# Patient Record
Sex: MALE | Race: OTHER | HISPANIC OR LATINO | URBAN - METROPOLITAN AREA
[De-identification: names, ages, dates, MRNs, and addresses within clinical notes are randomized per-mention and may not be internally consistent; named-entity substitution may affect disease eponyms.]

---

## 2020-02-23 ENCOUNTER — EMERGENCY (EMERGENCY)
Facility: HOSPITAL | Age: 25
LOS: 0 days | Discharge: HOME | End: 2020-02-23
Attending: EMERGENCY MEDICINE
Payer: COMMERCIAL

## 2020-02-23 VITALS
SYSTOLIC BLOOD PRESSURE: 173 MMHG | TEMPERATURE: 98 F | DIASTOLIC BLOOD PRESSURE: 95 MMHG | RESPIRATION RATE: 17 BRPM | HEART RATE: 109 BPM | OXYGEN SATURATION: 100 %

## 2020-02-23 VITALS — WEIGHT: 265 LBS | HEIGHT: 69 IN

## 2020-02-23 DIAGNOSIS — M54.5 LOW BACK PAIN: ICD-10-CM

## 2020-02-23 DIAGNOSIS — M40.56 LORDOSIS, UNSPECIFIED, LUMBAR REGION: ICD-10-CM

## 2020-02-23 DIAGNOSIS — M54.9 DORSALGIA, UNSPECIFIED: ICD-10-CM

## 2020-02-23 PROCEDURE — 72100 X-RAY EXAM L-S SPINE 2/3 VWS: CPT

## 2020-02-23 PROCEDURE — 72100 X-RAY EXAM L-S SPINE 2/3 VWS: CPT | Mod: 26

## 2020-02-23 PROCEDURE — 99283 EMERGENCY DEPT VISIT LOW MDM: CPT

## 2020-02-23 PROCEDURE — 96372 THER/PROPH/DIAG INJ SC/IM: CPT

## 2020-02-23 PROCEDURE — 99283 EMERGENCY DEPT VISIT LOW MDM: CPT | Mod: 25

## 2020-02-23 RX ORDER — KETOROLAC TROMETHAMINE 30 MG/ML
30 SYRINGE (ML) INJECTION ONCE
Refills: 0 | Status: DISCONTINUED | OUTPATIENT
Start: 2020-02-23 | End: 2020-02-23

## 2020-02-23 RX ORDER — CYCLOBENZAPRINE HYDROCHLORIDE 10 MG/1
10 TABLET, FILM COATED ORAL ONCE
Refills: 0 | Status: COMPLETED | OUTPATIENT
Start: 2020-02-23 | End: 2020-02-23

## 2020-02-23 RX ADMIN — CYCLOBENZAPRINE HYDROCHLORIDE 10 MILLIGRAM(S): 10 TABLET, FILM COATED ORAL at 17:49

## 2020-02-23 RX ADMIN — Medication 30 MILLIGRAM(S): at 17:49

## 2020-02-23 NOTE — ED STATDOCS - PROGRESS NOTE DETAILS
24 yr. old male PMH: presents to ED with right side low back pain after coughing. Pain worse on movement and getting out of bed. Smokes Marijuana. No fever or chills. No apparent trauma. Seen and examined by attending in intake. Plan: X-Ray and pain medication. Will F/U with results and re evaluate. Zoraida CARREON

## 2020-02-23 NOTE — ED STATDOCS - ATTENDING CONTRIBUTION TO CARE
I Marcos Saucedo MD saw and examined the patient. MLP saw and examined the patient under my supervision. I discussed the care of the patient with MLP and agree with MLP's plan, assessment and care of the patient while in the ED.

## 2020-02-23 NOTE — ED ADULT TRIAGE NOTE - CHIEF COMPLAINT QUOTE
Pt states he was on the toilet today, twisted and developed an acute onset of right sided LBP that is sharp and unable to bear weight on right side. Pt also states he had similar issue 3 months ago, saw a spine MD, prescribed steroids and pain resolved. Today pain is also described as a spasm, denies any numbness or tingling in extremities

## 2020-02-23 NOTE — ED STATDOCS - MUSCULOSKELETAL, MLM
mild TTP of paralumbar R spine. 5/5 strength all extremities with flexion and extension. mild TTP of para-lumbar R spine. 5/5 strength all extremities with flexion and extension. No saddle anesthesia. 5/5 strength on flexion and extension of all limbs.

## 2020-02-23 NOTE — ED ADULT NURSE NOTE - OBJECTIVE STATEMENT
Patient presents to ED complaining of back pain. Patient complaining of lower back pain radiating down LE. Patient states pain worsens positionally. Patient dneies numbness and tingling, HA, blurred vision, urinary sx, CP, SOB

## 2020-02-23 NOTE — ED STATDOCS - NS_ ATTENDINGSCRIBEDETAILS _ED_A_ED_FT
I Marcos Saucedo MD saw and examined the patient. Scribe documented for me and under my supervision. I have modified the scribe's documentation where necessary to reflect my history, physical exam and other relevant documentations pertinent to the care of the patient.

## 2020-02-23 NOTE — ED STATDOCS - NSFOLLOWUPINSTRUCTIONS_ED_ALL_ED_FT
Back Pain    WHAT YOU NEED TO KNOW:    Back pain is common. It can be caused by many conditions, such as arthritis or the breakdown of spinal discs. Your risk for back pain is increased by injuries, lack of activity, or repeated bending and twisting. You may feel sore or stiff on one or both sides of your back. The pain may spread to your buttocks or thighs.    DISCHARGE INSTRUCTIONS:    Return to the emergency department if:     You have pain, numbness, or weakness in one or both legs.      Your pain becomes so severe that you cannot walk.      You cannot control your urine or bowel movements.      You have severe back pain with chest pain.      You have severe back pain, nausea, and vomiting.      You have severe back pain that spreads to your side or genital area.    Contact your healthcare provider if:     You have back pain that does not get better with rest and pain medicine.      You have a fever.      You have pain that worsens when you are on your back or when you rest.      You have pain that worsens when you cough or sneeze.      You lose weight without trying.      You have questions or concerns about your condition or care.    Medicines:     NSAIDs help decrease swelling and pain. This medicine is available with or without a doctor's order. NSAIDs can cause stomach bleeding or kidney problems in certain people. If you take blood thinner medicine, always ask your healthcare provider if NSAIDs are safe for you. Always read the medicine label and follow directions.      Acetaminophen decreases pain and fever. It is available without a doctor's order. Ask how much to take and how often to take it. Follow directions. Read the labels of all other medicines you are using to see if they also contain acetaminophen, or ask your doctor or pharmacist. Acetaminophen can cause liver damage if not taken correctly. Do not use more than 4 grams (4,000 milligrams) total of acetaminophen in one day.       Muscle relaxers help decrease muscle spasms and back pain.      Prescription pain medicine may be given. Ask your healthcare provider how to take this medicine safely. Some prescription pain medicines contain acetaminophen. Do not take other medicines that contain acetaminophen without talking to your healthcare provider. Too much acetaminophen may cause liver damage. Prescription pain medicine may cause constipation. Ask your healthcare provider how to prevent or treat constipation.       Take your medicine as directed. Contact your healthcare provider if you think your medicine is not helping or if you have side effects. Tell him or her if you are allergic to any medicine. Keep a list of the medicines, vitamins, and herbs you take. Include the amounts, and when and why you take them. Bring the list or the pill bottles to follow-up visits. Carry your medicine list with you in case of an emergency.    How to manage your back pain:     Apply ice on your back for 15 to 20 minutes every hour or as directed. Use an ice pack, or put crushed ice in a plastic bag. Cover it with a towel before you apply it to your skin. Ice helps prevent tissue damage and decreases pain.      Apply heat on your back for 20 to 30 minutes every 2 hours for as many days as directed. Heat helps decrease pain and muscle spasms.      Stay active as much as you can without causing more pain. Bed rest could make your back pain worse. Avoid heavy lifting until your pain is gone.      Go to physical therapy as directed. A physical therapist can teach you exercises to help improve movement and strength, and to decrease pain.    Follow up with your healthcare provider in 2 weeks, or as directed: Write down your questions so you remember to ask them during your visits.    Rest. No lifting or physical exertion. Take Medrol dose pack as directed. Flexeril for muscle spasm as prescribed. DO NOT DRIVE while taking this medication.   No ibuprofen for 20 hrs. then Ibuprofen 600 mg. PO every 6 hrs. for pain as needed. Follow up with PMD and Spine specialist as needed.

## 2020-02-23 NOTE — ED STATDOCS - CARDIAC, MLM
normal rate, regular rhythm, and no murmur. 2+ pulse top b/l DP arteries normal rate, regular rhythm, and no gallops or rubs. 2+ pulse top b/l DP arteries

## 2020-02-23 NOTE — ED STATDOCS - PATIENT PORTAL LINK FT
You can access the FollowMyHealth Patient Portal offered by [facility] by registering at the following website: [email or web address]. By joining [facility]’s FollowMyHealth portal, you will also be able to view your health information using other applications (apps) compatible with our system.

## 2020-02-23 NOTE — ED STATDOCS - RESPIRATORY, MLM
breath sounds clear and equal bilaterally. breath sounds clear and equal bilaterally. no retractions.

## 2020-02-23 NOTE — ED STATDOCS - OBJECTIVE STATEMENT
25 y/o male with no PMHx presents to the ED c/o low back pain, R hip pain radiating to LE aggravated with positional change. Pt reports that pain came on after coughing episode aggravated with smoking. Notes worsening of pain when pt got off of bed and toilet. Pt smokes marijuana. No other illicit drugs. Social EtOH. No cigarettes. Denies fever, chills, urinary sx, any other acute sx.

## 2020-02-24 RX ORDER — IBUPROFEN 200 MG
1 TABLET ORAL
Qty: 15 | Refills: 0
Start: 2020-02-24

## 2020-02-24 RX ORDER — CYCLOBENZAPRINE HYDROCHLORIDE 10 MG/1
1 TABLET, FILM COATED ORAL
Qty: 10 | Refills: 0
Start: 2020-02-24

## 2020-02-24 NOTE — ED POST DISCHARGE NOTE - RESULT SUMMARY
Mother called that no rx was sent to pharmacy. Will send rx for motrin and flexeril. signed Mercedez Kenney PA-C

## 2020-02-25 RX ORDER — CYCLOBENZAPRINE HYDROCHLORIDE 10 MG/1
1 TABLET, FILM COATED ORAL
Qty: 10 | Refills: 0
Start: 2020-02-25

## 2020-02-25 RX ORDER — IBUPROFEN 200 MG
1 TABLET ORAL
Qty: 15 | Refills: 0
Start: 2020-02-25

## 2022-01-26 ENCOUNTER — EMERGENCY (EMERGENCY)
Facility: HOSPITAL | Age: 27
LOS: 0 days | Discharge: HOME | End: 2022-01-27
Attending: EMERGENCY MEDICINE
Payer: COMMERCIAL

## 2022-01-26 VITALS
OXYGEN SATURATION: 95 % | HEART RATE: 121 BPM | RESPIRATION RATE: 18 BRPM | HEIGHT: 69 IN | TEMPERATURE: 98 F | WEIGHT: 220.46 LBS | SYSTOLIC BLOOD PRESSURE: 150 MMHG | DIASTOLIC BLOOD PRESSURE: 93 MMHG
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
## 2022-01-26 DIAGNOSIS — S06.0X0A CONCUSSION WITHOUT LOSS OF CONSCIOUSNESS, INITIAL ENCOUNTER: ICD-10-CM

## 2022-01-26 DIAGNOSIS — S02.2XXA FRACTURE OF NASAL BONES, INITIAL ENCOUNTER FOR CLOSED FRACTURE: ICD-10-CM

## 2022-01-26 DIAGNOSIS — R51.9 HEADACHE, UNSPECIFIED: ICD-10-CM
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
## 2022-01-26 PROCEDURE — 99053 MED SERV 10PM-8AM 24 HR FAC: CPT

## 2022-01-26 PROCEDURE — 70450 CT HEAD/BRAIN W/O DYE: CPT | Mod: 26,MG

## 2022-01-26 PROCEDURE — 99285 EMERGENCY DEPT VISIT HI MDM: CPT

## 2022-01-26 PROCEDURE — 70450 CT HEAD/BRAIN W/O DYE: CPT | Mod: MG

## 2022-01-26 PROCEDURE — G1004: CPT

## 2022-01-26 PROCEDURE — 72125 CT NECK SPINE W/O DYE: CPT | Mod: 26,MG

## 2022-01-26 PROCEDURE — 72125 CT NECK SPINE W/O DYE: CPT | Mod: MG

## 2022-01-26 PROCEDURE — 99284 EMERGENCY DEPT VISIT MOD MDM: CPT | Mod: 25
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
## 2022-01-27 VITALS
DIASTOLIC BLOOD PRESSURE: 80 MMHG | RESPIRATION RATE: 17 BRPM | OXYGEN SATURATION: 97 % | HEART RATE: 99 BPM | TEMPERATURE: 98 F | SYSTOLIC BLOOD PRESSURE: 139 MMHG

## 2022-01-27 PROBLEM — Z78.9 OTHER SPECIFIED HEALTH STATUS: Chronic | Status: ACTIVE | Noted: 2020-03-12

## 2022-01-27 NOTE — ED PROVIDER NOTE - CLINICAL SUMMARY MEDICAL DECISION MAKING FREE TEXT BOX
return to ed for intractable HA, persistent vomiting, or new onset motor/sensory deficits   ambulating in nad
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
## 2022-01-27 NOTE — ED PROVIDER NOTE - PATIENT PORTAL LINK FT
You can access the FollowMyHealth Patient Portal offered by [facility] by registering at the following website: [email or web address]. By joining [facility]’s FollowMyHealth portal, you will also be able to view your health information using other applications (apps) compatible with our system.

## 2022-08-07 ENCOUNTER — EMERGENCY (EMERGENCY)
Facility: HOSPITAL | Age: 27
LOS: 0 days | Discharge: HOME | End: 2022-08-07
Attending: STUDENT IN AN ORGANIZED HEALTH CARE EDUCATION/TRAINING PROGRAM
Payer: COMMERCIAL

## 2022-08-07 VITALS
HEART RATE: 90 BPM | SYSTOLIC BLOOD PRESSURE: 151 MMHG | OXYGEN SATURATION: 98 % | DIASTOLIC BLOOD PRESSURE: 89 MMHG | RESPIRATION RATE: 16 BRPM | TEMPERATURE: 99 F

## 2022-08-07 VITALS — HEIGHT: 69 IN | WEIGHT: 279.99 LBS

## 2022-08-07 DIAGNOSIS — R45.851 SUICIDAL IDEATIONS: ICD-10-CM

## 2022-08-07 DIAGNOSIS — F10.10 ALCOHOL ABUSE, UNCOMPLICATED: ICD-10-CM

## 2022-08-07 DIAGNOSIS — F19.90 OTHER PSYCHOACTIVE SUBSTANCE USE, UNSPECIFIED, UNCOMPLICATED: ICD-10-CM

## 2022-08-07 DIAGNOSIS — F12.10 CANNABIS ABUSE, UNCOMPLICATED: ICD-10-CM

## 2022-08-07 DIAGNOSIS — Z20.822 CONTACT WITH AND (SUSPECTED) EXPOSURE TO COVID-19: ICD-10-CM

## 2022-08-07 DIAGNOSIS — F17.290 NICOTINE DEPENDENCE, OTHER TOBACCO PRODUCT, UNCOMPLICATED: ICD-10-CM

## 2022-08-07 DIAGNOSIS — F19.94 OTHER PSYCHOACTIVE SUBSTANCE USE, UNSPECIFIED WITH PSYCHOACTIVE SUBSTANCE-INDUCED MOOD DISORDER: ICD-10-CM

## 2022-08-07 DIAGNOSIS — F13.10 SEDATIVE, HYPNOTIC OR ANXIOLYTIC ABUSE, UNCOMPLICATED: ICD-10-CM

## 2022-08-07 DIAGNOSIS — F32.A DEPRESSION, UNSPECIFIED: ICD-10-CM

## 2022-08-07 DIAGNOSIS — F14.10 COCAINE ABUSE, UNCOMPLICATED: ICD-10-CM

## 2022-08-07 LAB
ALBUMIN SERPL ELPH-MCNC: 3.8 G/DL — SIGNIFICANT CHANGE UP (ref 3.3–5)
ALP SERPL-CCNC: 66 U/L — SIGNIFICANT CHANGE UP (ref 40–120)
ALT FLD-CCNC: 69 U/L — SIGNIFICANT CHANGE UP (ref 12–78)
ANION GAP SERPL CALC-SCNC: 6 MMOL/L — SIGNIFICANT CHANGE UP (ref 5–17)
APAP SERPL-MCNC: < 2 UG/ML (ref 10–30)
APPEARANCE UR: CLEAR — SIGNIFICANT CHANGE UP
AST SERPL-CCNC: 58 U/L — HIGH (ref 15–37)
BASOPHILS # BLD AUTO: 0.07 K/UL — SIGNIFICANT CHANGE UP (ref 0–0.2)
BASOPHILS NFR BLD AUTO: 0.5 % — SIGNIFICANT CHANGE UP (ref 0–2)
BILIRUB SERPL-MCNC: 0.4 MG/DL — SIGNIFICANT CHANGE UP (ref 0.2–1.2)
BILIRUB UR-MCNC: NEGATIVE — SIGNIFICANT CHANGE UP
BUN SERPL-MCNC: 8 MG/DL — SIGNIFICANT CHANGE UP (ref 7–23)
CALCIUM SERPL-MCNC: 9.4 MG/DL — SIGNIFICANT CHANGE UP (ref 8.5–10.1)
CHLORIDE SERPL-SCNC: 107 MMOL/L — SIGNIFICANT CHANGE UP (ref 96–108)
CO2 SERPL-SCNC: 28 MMOL/L — SIGNIFICANT CHANGE UP (ref 22–31)
COLOR SPEC: YELLOW — SIGNIFICANT CHANGE UP
CREAT SERPL-MCNC: 0.79 MG/DL — SIGNIFICANT CHANGE UP (ref 0.5–1.3)
DIFF PNL FLD: ABNORMAL
EGFR: 127 ML/MIN/1.73M2 — SIGNIFICANT CHANGE UP
EOSINOPHIL # BLD AUTO: 0.1 K/UL — SIGNIFICANT CHANGE UP (ref 0–0.5)
EOSINOPHIL NFR BLD AUTO: 0.7 % — SIGNIFICANT CHANGE UP (ref 0–6)
ETHANOL SERPL-MCNC: <10 MG/DL — SIGNIFICANT CHANGE UP (ref 0–10)
GLUCOSE SERPL-MCNC: 101 MG/DL — HIGH (ref 70–99)
GLUCOSE UR QL: NEGATIVE — SIGNIFICANT CHANGE UP
HCT VFR BLD CALC: 43.7 % — SIGNIFICANT CHANGE UP (ref 39–50)
HGB BLD-MCNC: 15.1 G/DL — SIGNIFICANT CHANGE UP (ref 13–17)
IMM GRANULOCYTES NFR BLD AUTO: 1.2 % — SIGNIFICANT CHANGE UP (ref 0–1.5)
KETONES UR-MCNC: ABNORMAL
LEUKOCYTE ESTERASE UR-ACNC: NEGATIVE — SIGNIFICANT CHANGE UP
LYMPHOCYTES # BLD AUTO: 17.5 % — SIGNIFICANT CHANGE UP (ref 13–44)
LYMPHOCYTES # BLD AUTO: 2.58 K/UL — SIGNIFICANT CHANGE UP (ref 1–3.3)
MCHC RBC-ENTMCNC: 30.7 PG — SIGNIFICANT CHANGE UP (ref 27–34)
MCHC RBC-ENTMCNC: 34.6 GM/DL — SIGNIFICANT CHANGE UP (ref 32–36)
MCV RBC AUTO: 88.8 FL — SIGNIFICANT CHANGE UP (ref 80–100)
MONOCYTES # BLD AUTO: 0.73 K/UL — SIGNIFICANT CHANGE UP (ref 0–0.9)
MONOCYTES NFR BLD AUTO: 4.9 % — SIGNIFICANT CHANGE UP (ref 2–14)
NEUTROPHILS # BLD AUTO: 11.1 K/UL — HIGH (ref 1.8–7.4)
NEUTROPHILS NFR BLD AUTO: 75.2 % — SIGNIFICANT CHANGE UP (ref 43–77)
NITRITE UR-MCNC: NEGATIVE — SIGNIFICANT CHANGE UP
PCP SPEC-MCNC: SIGNIFICANT CHANGE UP
PH UR: 6 — SIGNIFICANT CHANGE UP (ref 5–8)
PLATELET # BLD AUTO: 340 K/UL — SIGNIFICANT CHANGE UP (ref 150–400)
POTASSIUM SERPL-MCNC: 4.3 MMOL/L — SIGNIFICANT CHANGE UP (ref 3.5–5.3)
POTASSIUM SERPL-SCNC: 4.3 MMOL/L — SIGNIFICANT CHANGE UP (ref 3.5–5.3)
PROT SERPL-MCNC: 7.8 GM/DL — SIGNIFICANT CHANGE UP (ref 6–8.3)
PROT UR-MCNC: SIGNIFICANT CHANGE UP MG/DL
RBC # BLD: 4.92 M/UL — SIGNIFICANT CHANGE UP (ref 4.2–5.8)
RBC # FLD: 13.2 % — SIGNIFICANT CHANGE UP (ref 10.3–14.5)
SALICYLATES SERPL-MCNC: <1.7 MG/DL (ref 2.8–20)
SARS-COV-2 RNA SPEC QL NAA+PROBE: SIGNIFICANT CHANGE UP
SODIUM SERPL-SCNC: 141 MMOL/L — SIGNIFICANT CHANGE UP (ref 135–145)
SP GR SPEC: 1.02 — SIGNIFICANT CHANGE UP (ref 1.01–1.02)
TSH SERPL-MCNC: 1.32 UU/ML — SIGNIFICANT CHANGE UP (ref 0.34–4.82)
UROBILINOGEN FLD QL: NEGATIVE — SIGNIFICANT CHANGE UP
WBC # BLD: 14.75 K/UL — HIGH (ref 3.8–10.5)
WBC # FLD AUTO: 14.75 K/UL — HIGH (ref 3.8–10.5)

## 2022-08-07 PROCEDURE — 99285 EMERGENCY DEPT VISIT HI MDM: CPT

## 2022-08-07 PROCEDURE — U0005: CPT

## 2022-08-07 PROCEDURE — 85025 COMPLETE CBC W/AUTO DIFF WBC: CPT

## 2022-08-07 PROCEDURE — 99285 EMERGENCY DEPT VISIT HI MDM: CPT | Mod: 25

## 2022-08-07 PROCEDURE — 93010 ELECTROCARDIOGRAM REPORT: CPT

## 2022-08-07 PROCEDURE — 81001 URINALYSIS AUTO W/SCOPE: CPT

## 2022-08-07 PROCEDURE — 80307 DRUG TEST PRSMV CHEM ANLYZR: CPT

## 2022-08-07 PROCEDURE — 36415 COLL VENOUS BLD VENIPUNCTURE: CPT

## 2022-08-07 PROCEDURE — 84443 ASSAY THYROID STIM HORMONE: CPT

## 2022-08-07 PROCEDURE — 93005 ELECTROCARDIOGRAM TRACING: CPT

## 2022-08-07 PROCEDURE — U0003: CPT

## 2022-08-07 PROCEDURE — 80053 COMPREHEN METABOLIC PANEL: CPT
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
## 2022-08-07 NOTE — ED BEHAVIORAL HEALTH ASSESSMENT NOTE - OTHER PAST PSYCHIATRIC HISTORY (INCLUDE DETAILS REGARDING ONSET, COURSE OF ILLNESS, INPATIENT/OUTPATIENT TREATMENT)
h/o being on Lexapro by his PCP, reports this helped but reports stopping it 1 year ago and resorted to use of illicit substances  h/o ADHD previously on Concerta, Ritalin (mother provided letter from school psychologist indicating ADHD dx, but patient not on any treatment for ADHD currently)

## 2022-08-07 NOTE — ED PROVIDER NOTE - NS_ ATTENDINGSCRIBEDETAILS _ED_A_ED_FT
The scribe's documentation has been prepared under my direction and personally reviewed by me in its entirety. I confirm that the note above accurately reflects all work, treatment, procedures, and medical decision making performed by me.  MARIA ELENA Raya-MS, MD  Internal/Emergency/Critical Care Medicine

## 2022-08-07 NOTE — ED ADULT NURSE NOTE - HPI (INCLUDE ILLNESS QUALITY, SEVERITY, DURATION, TIMING, CONTEXT, MODIFYING FACTORS, ASSOCIATED SIGNS AND SYMPTOMS)
pt a/ox3, reports worsening SI x2-3 months. pt reports "worsening thoughts while drinking last night, doing drugs at a party." pt reports +cocaine use last used approx 2-3 weeks ago. pt reports tobacco use/e-cigg. pt reports +xanax use x2 days ago. pt denies SI at this time. pt denies HI at this time. pt denies auditory and visual hallucinations.

## 2022-08-07 NOTE — ED PROVIDER NOTE - PROGRESS NOTE DETAILS
Per verbal report from psychiatric team, OK for discharge, social work to see patient and assist with outpatient follow up.

## 2022-08-07 NOTE — ED PROVIDER NOTE - OBJECTIVE STATEMENT
25 y/o male with no PMHx presents to the ED s/p suicidal thoughts worsening over last 2-3 months. Pt states he has been increasingly drinking, last drink last night, doing drugs, partying. Endorses cocaine use, last use 2-3 weeks ago, Xanax last used 2-3 days ago, e-cigarette use. no plan but attempted in past to pop a lot of xanax hoping he "didn't wake up," endorses whippet use. Pt mom states they can no longer take care of him at home, as they are up all night worried about him, no h/i no fever chills no n/v/d no blurred vision. Pt used to be on Lexapro, but stopped using it last year. Pt is a 27 y/o male with no PMHx presents to the ED s/p suicidal thoughts worsening over last 2-3 months. Pt states he has been increasingly drinking, last drink last night, doing drugs, partying. Endorses cocaine use, last use 2-3 weeks ago, Xanax last used 2-3 days ago, e-cigarette use. no plan but attempted in past to pop a lot of xanax hoping he "didn't wake up," endorses whippet use. Pt mom states they can no longer take care of him at home, as they are up all night worried about him, no h/i no fever chills no n/v/d no blurred vision. Pt used to be on Lexapro, but stopped using it last year.
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
## 2022-08-07 NOTE — ED BEHAVIORAL HEALTH NOTE - BEHAVIORAL HEALTH NOTE
Psych Sw met with Pt to assess needs, plan for safe d/c and to provide supportive resources. Psych Sw explained role and services provided. Pt agreeable to [facility] referral for outpt support and substance abuse. Pt provided with community resources including National Suicide Hotline, DASH hotline and [facility] Suicide and Crisis Lifeline Available 24 hours. Pt reports he is able to return home with transportation by mother. Pt denies SI , plan for suicide or SIB. Pt agrees to return to  ED or any ED, call 911 if SI occur. Psych MD and NP, ED team aware.     Referral to Christelle Garcia at [facility] made.

## 2022-08-07 NOTE — ED PROVIDER NOTE - PATIENT PORTAL LINK FT
You can access the FollowMyHealth Patient Portal offered by [facility] by registering at the following website: [email or web address]. By joining [facility]’s FollowMyHealth portal, you will also be able to view your health information using other applications (apps) compatible with our system.

## 2022-08-07 NOTE — ED PROVIDER NOTE - OTHER FINDINGS
NSR rate of 93 No AV blocks. narrow QRS and no [place] Blocks. No STEs. TWIs at V3, Wellen's Brugada, no Delta Waves. QTc of 460

## 2022-08-07 NOTE — ED BEHAVIORAL HEALTH ASSESSMENT NOTE - DESCRIPTION
mother reports patient with a congential liver diease? calm, cooperative, has not required any PRNs for agitation or anxiety single, +gf, unemployed, has a BS in Business Administration, lives with parents in [place]; born in [place] and came to [place] when he was 5 years old

## 2022-08-07 NOTE — ED PROVIDER NOTE - NSFOLLOWUPINSTRUCTIONS_ED_ALL_ED_FT
Please follow up as instructed by the Psychiatric team.    If your symptoms persist or worsen, please seek care. Either return to the Emergency Department, go to urgent care or see your primary care doctor.  Please refer to general information and instructions below:       Help Prevent Suicide    WHAT YOU NEED TO KNOW:    A person may see suicide as the only way to escape emotional or physical pain and suffering. You can help provide emotional support for him or her and get the help he or she needs. Learn to recognize warning signs that the person may be considering suicide. Resources are available to help you and the person.    DISCHARGE INSTRUCTIONS:    Call the person's local emergency number (911 in the ) if:   •The person has done something on purpose to hurt himself or herself.      •The person tries to attempt suicide.      •The person tells you he or she made a plan to attempt suicide.      Call the person's doctor or therapist if:   •The person acts out in anger, is reckless, or is abusing alcohol or drugs.      •The person has serious thoughts of suicide, even after treatment.      •You begin to see warning signs that the person may be considering suicide.      •The person has intense feelings of sadness, anger, revenge, or despair.      •The person withdraws from others.      •The person tells you he or she has more thoughts of suicide when alone.      •The person stops eating, or begins to smoke or drink heavily.      •The person says he or she is a burden because of a disability or disease.      •You have questions or concerns about the person's condition or care.      What to do if the person is having thoughts of suicide: Call the person's local emergency number (911 in the ) if you feel he or she is at immediate risk of suicide. Also call if he or she talks about an active suicide plan. Assume that the person intends to carry out his or her plan. The following are some things you can do:   •Contact a suicide prevention organization: ?For the National Suicide Prevention Lifeline, call either of the following: ?988      ?1-599.653.6854 (1-800-273-TALK)      ?For the Suicide Hotline, call 1-822.745.1796 (9-881-NKMZKGM)      ?For a list of international numbers: https://save.org/find-help/international-resources/      •Contact the person's therapist. His or her healthcare provider can give you a list of therapists if he or she does not have one.      •Keep medicines, weapons, and alcohol out of the person's reach. Make sure you do not put yourself at risk if the person has a weapon.      •Do not leave the person alone if he or she says he or she wants to attempt suicide. Ask the person if he or she has a plan. Do not leave the person alone if you think he or she may try it.      Warning signs to watch for:   •Talking about a plan for attempting suicide, or suddenly deciding to make a will      •Cutting himself or herself, burning the skin with cigarettes, or driving recklessly      •Drug or alcohol use, not taking prescribed medicine, or taking too much prescribed medicine      •Sudden anger, lashing out at others, or seeming hopeless, anxious, or angry and then suddenly becoming happy or peaceful      •Not wanting to spend time with others or doing things he or she usually enjoys      •Trouble at work, or not showing up for work      •A change in the way he or she eats, sleeps, or dresses      •Weight gain or loss or having less energy than usual      •Trouble sleeping or spending a lot of time sleeping      •Giving away or throwing away his or her belongings      •Suddenly not going to therapy      Treatments the person may need:   •Medicines may be given to prevent mood swings, or to decrease anxiety or depression. The person will need to take all medicines as directed. A sudden stop can be harmful. It may take 4 to 6 weeks for the medicine to help him or her feel better.      •A therapist can help the person identify and change negative feelings or beliefs about himself or herself. This may also help change the way the he or she feels and acts. A therapist can also help the person find ways to cope with things that cannot be changed.       What you can do to help the person:   •Encourage the person to seek help for drug or alcohol abuse. Drugs and alcohol can increase suicidal thoughts and make the person more likely to act on them.      •Help the person connect with others. Encourage him or her to become involved in the community. Some examples include tutoring a young student, volunteering at a local organization, or joining a group exercise program.      •Exercise with the person. Exercise can lift his or her mood, increase energy, and make it easier to sleep at night.      •Encourage the person to try new things. Adults who are open to new experiences handle stress and change better than those who are not.      •Call, visit, or send postcards to the person often. Check on him or her after the loss of a pet, longtime friend, or child. Holidays, birthdays, and anniversaries can be difficult for a person after a loss. The loss of a spouse can be especially painful and lonely.      •Help the person schedule a visit with his or her Alevism or spiritual leader. A Alevism or spiritual leader may be able to offer additional support and resources to the person.      •Encourage the person to continue taking medicine and going to therapy. Medicine and therapy can help improve his or her mental health.      Follow up with the person's doctor or therapist as directed: Write down your questions so you remember to ask them during your visits.    For support and more information:   •National Suicide Prevention Lifeline  [facility],[place] [address]  Phone: 7-755-439-BZQM (2692)  Web Address: http://www.suicidepreventionlifeline.org      •Suicide Awareness Voices of Education  [address]  [place], [place]  Phone: 1-520.861.6200  Web Address: http://www.save.org

## 2022-08-07 NOTE — ED ADULT TRIAGE NOTE - CHIEF COMPLAINT QUOTE
PT C/O SI X WEEKS, HX OF DEPRESSION, ANXIETY, ADHD, +ETOH, "I DRINK A LOT EVERY 2-3 DAYS, LAST DRINK LAST NIGHT." +COCAINE AND XANAX USE, LAST COCAINE 2 WEEKS AGO.

## 2022-08-07 NOTE — ED PROVIDER NOTE - CLINICAL SUMMARY MEDICAL DECISION MAKING FREE TEXT BOX
Pt is a 25 y/o male with no PMHx presents to the ED s/p suicidal thoughts worsening over last 2-3 months. Pt also with poly substance abuse as a contributing factor. Pt with good social support system.   Patient evaluated by psychiatry team and seen by psych SW. Pt cleared for discharge and resource provided and appointments made per psych Social worker.   medically cleared for discharge.   Will discharge patient home and patient and family given anticipatory guidance.

## 2022-08-07 NOTE — ED BEHAVIORAL HEALTH ASSESSMENT NOTE - NSBHSAOPI_PSY_A_CORE FT
via sniffing-- last used 5-6 months ago; reports also previously abused Percocet, last used a year ago

## 2022-08-07 NOTE — ED BEHAVIORAL HEALTH ASSESSMENT NOTE - SUMMARY
25 y/o single  male, +gf, no kids, unemployed, lives with parents in [place], with PPHx of depressive d/o, h/o being on Lexapro but spontaneously stopped a year ago, h/o 1 SA via OD on Xanax when he was 20y/o which did not require treatment, no h/o SIB, no h/o psychiatric hospitalizations, substance use significant for alcohol, marijuana, nitrous oxide, cocaine, heroin, no h/o substance treatment in the past with no PMHx presents to the ED s/p suicidal thoughts worsening over last 2-3 months.  Psychiatry was consulted to assess for depression, SI.  Patient seen and evaluated, awake and alert, reports has been drinking more, using more drugs and has been depressed and suicidal, without plan or intent.  States he was intoxicated last night and laid in the middle of the road and was picked up by his father.  He reports multiple losses and deaths in his life, relationship issues with his gf to be triggers.  He denies current SI/HI, AVH, or thoughts of paranoia.  Patient not appearing to be in acute alcohol or benzo withdrawal.  Offered patient voluntary psychiatric admission but declines at this time, is open to outpt referrals for both mental health and substance/alcohol use.  Spoke with patient's mother who reports patient's worsening substance and alcohol use, states patient was suicidal yesterday laying in the middle of the street, reporting to her that he wants to kill himself.  She denies previous attempts, denies he has access to guns.  Presentation consistent with substance induced mood d/o, likely events are substance induced and does not indicate patient needs psychiatric admission.  Patient would benefit from substance treatment (inpt vs outpt), patient can f/u with outpt psychiatrist, [facility] to provide patient and mother with referrals.  Spoke with mother who is comfortable with this plan, informed her should his sx worsen or should he become acutely suicidal or homicidal to call 911 or have patient return back to ER.

## 2022-08-07 NOTE — ED BEHAVIORAL HEALTH ASSESSMENT NOTE - REFERRAL / APPOINTMENT DETAILS
patient may f/u outpt [facility] walk in clinic [phone],  to provide patient with referrals for outpt psychiatry and substance treatment referrals

## 2022-08-07 NOTE — ED PROVIDER NOTE - PHYSICAL EXAMINATION
PHYSICAL EXAM:  GENERAL: in NAD, Sitting comfortable in bed, in no respiratory distress  HEAD: Atraumatic, no lópez's sign, no periorbital ecchymosis   EYES: PERRL, EOMs intact b/l w/out deficits  ENMT: Moist membranes, no anterior/posterior, or supraclavicular LAD  CHEST/LUNG: CTAB no wheezes/rhonchi/rales  HEART: RRR no murmur/gallops/rubs  ABDOMEN: +BS, soft, NT, ND  EXTREMITIES: No LE edema, +2 radial pulses b/l  NERVOUS SYSTEM:  A&Ox4, No motor deficits or sensory deficits to b/l UEs  Heme/LYMPH: No ecchymosis or bruising or LAD  SKIN:  No new rashes or DTIs

## 2022-08-07 NOTE — ED BEHAVIORAL HEALTH ASSESSMENT NOTE - RISK ASSESSMENT
Risk factors: active substance abuse, recent losses, noncompliant with treatment, not receiving treatment, recent SI but while intoxicated     Protective factors: no current SIIP/HIIP, no h/o SA/SIB, no h/o psych admissions, no access to weapons, good physical health, no psychosis, domiciled, intact marriage, social supports, help-seeking behaviors    Overall, pt is a chronic high risk due to active substance use but at this time is an acute low risk of harm to self/others and does not require psychiatric admission for safety and stabilization. Low Acute Suicide Risk

## 2022-10-24 NOTE — ED BEHAVIORAL HEALTH ASSESSMENT NOTE - PREFERRED LANGUAGE
[address] [place] [address]: \"She will continue with the gabapentin and the Norco for the pain. She will increase the gabapentin to 300 mg 3 times a day. \" English

## 2022-11-18 NOTE — ED ADULT NURSE NOTE - NSFALLRSKHARMRISK_ED_ALL_ED
SURGERY DISCHARGE INSTRUCTIONS    You may be drowsy or lightheaded after receiving sedation or anesthesia. A responsible person should be with you for the next 24 hours. FOLLOW UP: Call office to schedule follow-up appointment in 2 weeks. DIET: Advance your diet as tolerated. Start with light diet and progress to your normal diet as you feel like eating. If you experience nausea or repeated episodes of vomiting which persist beyond 12-24 hours, notify your doctor. ACTIVITY: Rest today. Increase activity gradually. No driving for 1 day. No driving while on prescription pain medication. No heavy lifting for 4 weeks - nothing over 10 lbs, or heavier than a milk jug. SHOWER/BATHING: Okay to shower in 24 hours after procedure. No tub bathing, swimming or soaking for 2 weeks. WOUND CARE: You have Dermabond dressing (skin glue) applied to your incisions. You do not need to apply anything over them. The glue will gradually work itself off over the next few weeks. Avoid directly applying lotions, creams or oils to your incision. Keep incisions clean and dry. Always ensure you and your care giver clean hands before and after caring for the wound. You may place ice on incisions to decrease the pain and bruising. MEDICATIONS: Take as prescribed. You may take over the counter ibuprofen or tylenol for pain as directed, limit total amount of acetaminophen (tylenol) to 3 grams per 24-hour period. Okay to resume anticoagulant medication after 24hrs. You may experience constipation while taking pain medication, you may take over the counter stool softeners (Docusate/Colace or Senokot-S) as directed.        SPECIAL INSTRUCTIONS:   Call physician if they or any other problems occur:  Call the office if you have a fever over >101F, or if your incision becomes red, tender, or drains more than a small amount of clear fluid  Fever over 101°    Redness, swelling, hardness or warmth at the operative site  Unrelieved nausea    Foul smelling or cloudy drainage at the operative site   Unrelieved pain    Blood soaked dressing (Some oozing may be normal) no

## 2023-02-22 NOTE — ED BEHAVIORAL HEALTH ASSESSMENT NOTE - ADDITIONAL DETAILS ALL
1/19/2023     No future appointments. Treatment Device Design After Initial Simulation Justification (Will Render If Bill For Treatment Devices = Yes): The patient is status post radiation simulation and is evaluated as to the use of additional devices for shielding and placement for radiation therapy. see hpi

## 2023-07-22 ENCOUNTER — EMERGENCY (EMERGENCY)
Facility: HOSPITAL | Age: 28
LOS: 0 days | Discharge: HOME | End: 2023-07-22
Attending: HOSPITALIST
Payer: COMMERCIAL

## 2023-07-22 VITALS
HEART RATE: 135 BPM | DIASTOLIC BLOOD PRESSURE: 88 MMHG | SYSTOLIC BLOOD PRESSURE: 145 MMHG | TEMPERATURE: 99 F | HEIGHT: 70 IN | OXYGEN SATURATION: 97 % | RESPIRATION RATE: 20 BRPM | WEIGHT: 300.05 LBS

## 2023-07-22 DIAGNOSIS — R00.0 TACHYCARDIA, UNSPECIFIED: ICD-10-CM

## 2023-07-22 DIAGNOSIS — T50.901A POISONING BY UNSPECIFIED DRUGS, MEDICAMENTS AND BIOLOGICAL SUBSTANCES, ACCIDENTAL (UNINTENTIONAL), INITIAL ENCOUNTER: ICD-10-CM

## 2023-07-22 DIAGNOSIS — F11.90 OPIOID USE, UNSPECIFIED, UNCOMPLICATED: ICD-10-CM

## 2023-07-22 DIAGNOSIS — F17.290 NICOTINE DEPENDENCE, OTHER TOBACCO PRODUCT, UNCOMPLICATED: ICD-10-CM

## 2023-07-22 DIAGNOSIS — F10.90 ALCOHOL USE, UNSPECIFIED, UNCOMPLICATED: ICD-10-CM

## 2023-07-22 DIAGNOSIS — F19.10 OTHER PSYCHOACTIVE SUBSTANCE ABUSE, UNCOMPLICATED: ICD-10-CM

## 2023-07-22 LAB
ALBUMIN SERPL ELPH-MCNC: 3.9 G/DL — SIGNIFICANT CHANGE UP (ref 3.3–5)
ALP SERPL-CCNC: 95 U/L — SIGNIFICANT CHANGE UP (ref 40–120)
ALT FLD-CCNC: 89 U/L — HIGH (ref 12–78)
AMPHET UR-MCNC: NEGATIVE — SIGNIFICANT CHANGE UP
ANION GAP SERPL CALC-SCNC: 9 MMOL/L — SIGNIFICANT CHANGE UP (ref 5–17)
APAP SERPL-MCNC: < 2 UG/ML (ref 10–30)
APPEARANCE UR: CLEAR — SIGNIFICANT CHANGE UP
AST SERPL-CCNC: 58 U/L — HIGH (ref 15–37)
BACTERIA # UR AUTO: ABNORMAL
BARBITURATES UR SCN-MCNC: NEGATIVE — SIGNIFICANT CHANGE UP
BASOPHILS # BLD AUTO: 0.05 K/UL — SIGNIFICANT CHANGE UP (ref 0–0.2)
BASOPHILS NFR BLD AUTO: 0.4 % — SIGNIFICANT CHANGE UP (ref 0–2)
BENZODIAZ UR-MCNC: NEGATIVE — SIGNIFICANT CHANGE UP
BILIRUB SERPL-MCNC: 0.1 MG/DL — LOW (ref 0.2–1.2)
BILIRUB UR-MCNC: NEGATIVE — SIGNIFICANT CHANGE UP
BUN SERPL-MCNC: 6 MG/DL — LOW (ref 7–23)
CALCIUM SERPL-MCNC: 8.3 MG/DL — LOW (ref 8.5–10.1)
CHLORIDE SERPL-SCNC: 110 MMOL/L — HIGH (ref 96–108)
CO2 SERPL-SCNC: 22 MMOL/L — SIGNIFICANT CHANGE UP (ref 22–31)
COCAINE METAB.OTHER UR-MCNC: NEGATIVE — SIGNIFICANT CHANGE UP
COLOR SPEC: YELLOW — SIGNIFICANT CHANGE UP
CREAT SERPL-MCNC: 1.02 MG/DL — SIGNIFICANT CHANGE UP (ref 0.5–1.3)
DIFF PNL FLD: ABNORMAL
EGFR: 105 ML/MIN/1.73M2 — SIGNIFICANT CHANGE UP
EOSINOPHIL # BLD AUTO: 0.04 K/UL — SIGNIFICANT CHANGE UP (ref 0–0.5)
EOSINOPHIL NFR BLD AUTO: 0.3 % — SIGNIFICANT CHANGE UP (ref 0–6)
EPI CELLS # UR: SIGNIFICANT CHANGE UP
ETHANOL SERPL-MCNC: 244 MG/DL — HIGH (ref 0–10)
GLUCOSE SERPL-MCNC: 189 MG/DL — HIGH (ref 70–99)
GLUCOSE UR QL: 100 MG/DL
HCT VFR BLD CALC: 46.8 % — SIGNIFICANT CHANGE UP (ref 39–50)
HGB BLD-MCNC: 16 G/DL — SIGNIFICANT CHANGE UP (ref 13–17)
IMM GRANULOCYTES NFR BLD AUTO: 2.1 % — HIGH (ref 0–0.9)
KETONES UR-MCNC: ABNORMAL
LEUKOCYTE ESTERASE UR-ACNC: NEGATIVE — SIGNIFICANT CHANGE UP
LYMPHOCYTES # BLD AUTO: 1.8 K/UL — SIGNIFICANT CHANGE UP (ref 1–3.3)
LYMPHOCYTES # BLD AUTO: 13.7 % — SIGNIFICANT CHANGE UP (ref 13–44)
MANUAL SMEAR VERIFICATION: SIGNIFICANT CHANGE UP
MCHC RBC-ENTMCNC: 29.6 PG — SIGNIFICANT CHANGE UP (ref 27–34)
MCHC RBC-ENTMCNC: 34.2 GM/DL — SIGNIFICANT CHANGE UP (ref 32–36)
MCV RBC AUTO: 86.7 FL — SIGNIFICANT CHANGE UP (ref 80–100)
METHADONE UR-MCNC: NEGATIVE — SIGNIFICANT CHANGE UP
MONOCYTES # BLD AUTO: 0.54 K/UL — SIGNIFICANT CHANGE UP (ref 0–0.9)
MONOCYTES NFR BLD AUTO: 4.1 % — SIGNIFICANT CHANGE UP (ref 2–14)
NEUTROPHILS # BLD AUTO: 10.46 K/UL — HIGH (ref 1.8–7.4)
NEUTROPHILS NFR BLD AUTO: 79.4 % — HIGH (ref 43–77)
NITRITE UR-MCNC: NEGATIVE — SIGNIFICANT CHANGE UP
OPIATES UR-MCNC: NEGATIVE — SIGNIFICANT CHANGE UP
PCP SPEC-MCNC: SIGNIFICANT CHANGE UP
PCP UR-MCNC: NEGATIVE — SIGNIFICANT CHANGE UP
PH UR: 5 — SIGNIFICANT CHANGE UP (ref 5–8)
PLAT MORPH BLD: NORMAL — SIGNIFICANT CHANGE UP
PLATELET # BLD AUTO: 397 K/UL — SIGNIFICANT CHANGE UP (ref 150–400)
POTASSIUM SERPL-MCNC: 3.3 MMOL/L — LOW (ref 3.5–5.3)
POTASSIUM SERPL-SCNC: 3.3 MMOL/L — LOW (ref 3.5–5.3)
PROT SERPL-MCNC: 8.4 GM/DL — HIGH (ref 6–8.3)
PROT UR-MCNC: 30 MG/DL
RBC # BLD: 5.4 M/UL — SIGNIFICANT CHANGE UP (ref 4.2–5.8)
RBC # FLD: 12.5 % — SIGNIFICANT CHANGE UP (ref 10.3–14.5)
RBC BLD AUTO: NORMAL — SIGNIFICANT CHANGE UP
RBC CASTS # UR COMP ASSIST: SIGNIFICANT CHANGE UP /HPF (ref 0–4)
SALICYLATES SERPL-MCNC: <1.7 MG/DL — LOW (ref 2.8–20)
SODIUM SERPL-SCNC: 141 MMOL/L — SIGNIFICANT CHANGE UP (ref 135–145)
SP GR SPEC: 1.02 — SIGNIFICANT CHANGE UP (ref 1.01–1.02)
THC UR QL: POSITIVE — SIGNIFICANT CHANGE UP
TSH SERPL-MCNC: 2.63 UU/ML — SIGNIFICANT CHANGE UP (ref 0.34–4.82)
UROBILINOGEN FLD QL: NEGATIVE — SIGNIFICANT CHANGE UP
WBC # BLD: 13.17 K/UL — HIGH (ref 3.8–10.5)
WBC # FLD AUTO: 13.17 K/UL — HIGH (ref 3.8–10.5)
WBC UR QL: SIGNIFICANT CHANGE UP /HPF (ref 0–5)

## 2023-07-22 PROCEDURE — 81001 URINALYSIS AUTO W/SCOPE: CPT

## 2023-07-22 PROCEDURE — 84443 ASSAY THYROID STIM HORMONE: CPT

## 2023-07-22 PROCEDURE — 99285 EMERGENCY DEPT VISIT HI MDM: CPT

## 2023-07-22 PROCEDURE — 80053 COMPREHEN METABOLIC PANEL: CPT

## 2023-07-22 PROCEDURE — 85025 COMPLETE CBC W/AUTO DIFF WBC: CPT

## 2023-07-22 PROCEDURE — 36415 COLL VENOUS BLD VENIPUNCTURE: CPT

## 2023-07-22 PROCEDURE — 99284 EMERGENCY DEPT VISIT MOD MDM: CPT

## 2023-07-22 PROCEDURE — 80307 DRUG TEST PRSMV CHEM ANLYZR: CPT

## 2023-07-22 RX ORDER — SODIUM CHLORIDE 9 MG/ML
1000 INJECTION INTRAMUSCULAR; INTRAVENOUS; SUBCUTANEOUS ONCE
Refills: 0 | Status: COMPLETED | OUTPATIENT
Start: 2023-07-22 | End: 2023-07-22

## 2023-07-22 RX ADMIN — SODIUM CHLORIDE 1000 MILLILITER(S): 9 INJECTION INTRAMUSCULAR; INTRAVENOUS; SUBCUTANEOUS at 03:04

## 2023-07-22 NOTE — ED ADULT NURSE NOTE - OBJECTIVE STATEMENT
Patient presents to ER for unresponsiveness, given Narcan prior to arrival [facility] 15. Respirations even and unlabored on 2L nasal cannula, patient placed on cardiac monitor. Await MD sanchez.

## 2023-07-22 NOTE — ED PROVIDER NOTE - OBJECTIVE STATEMENT
27-year-old male presents after being found unresponsive on the sidewalk by a bystander.  They called 911 and patient was given Narcan with good response.  Brought in by [facility].  patient admits to drinking alcohol and snorting heroin today.  Unsure how much he used.  Denies any SI or HI.

## 2023-07-22 NOTE — ED ADULT NURSE REASSESSMENT NOTE - NS ED NURSE REASSESS COMMENT FT1
patient awake, alert and oriented x4, ambulatory with steady gait. patient's friend arrived at triage to pick him up, patient escorted to waiting room and was driven home by friend. done

## 2023-07-22 NOTE — ED PROVIDER NOTE - CLINICAL SUMMARY MEDICAL DECISION MAKING FREE TEXT BOX
27-year-old male status post unintentional overdose revived with Narcan by EMS.  Will monitor here in the ED until clinically sober.  Patient offered support services for his polysubstance abuse however he declined.  Cooperative here in the ED with stable vital signs.  Family came to pick him up and take him home.

## 2023-07-22 NOTE — ED ADULT TRIAGE NOTE - AS PAIN REST
Patient is aware of results and verbalized understanding.   0 (no pain/absence of nonverbal indicators of pain)

## 2023-07-22 NOTE — ED PROVIDER NOTE - PATIENT PORTAL LINK FT
You can access the FollowMyHealth Patient Portal offered by [facility] by registering at the following website: [email or web address]. By joining [facility]’s FollowMyHealth portal, you will also be able to view your health information using other applications (apps) compatible with our system.

## 2023-07-22 NOTE — ED ADULT NURSE NOTE - SUICIDE SCREENING QUESTION 1
No
29 yo man with recent dysphagia since an episode where he choked on a piece of sausage.  He is swallowing liquids however, and I have a very low suspicion for a impacted esophageal foreign body.  We spoke at length and he will drink smoothies and liquids until he can be seen for outpatient EGD or return sooner if things worsen.

## 2023-07-22 NOTE — ED ADULT TRIAGE NOTE - CHIEF COMPLAINT QUOTE
pt bibems from street. As per ems Pt found on side of road sleeping, narcan x2." Pt denies taking any drugs. Admits to drinking alcohol. tachy to 135bpm in triage. pt denies any medical complaints. Pt place in room, monitor and co2 monitoring.

## 2023-07-22 NOTE — ED PROVIDER NOTE - PHYSICAL EXAMINATION

## 2023-07-22 NOTE — ED ADULT NURSE NOTE - NSFALLUNIVINTERV_ED_ALL_ED
Bed/Stretcher in lowest position, wheels locked, appropriate side rails in place/Call bell, personal items and telephone in reach/Instruct patient to call for assistance before getting out of bed/chair/stretcher/Non-slip footwear applied when patient is off stretcher/[place] to call system/Physically safe environment - no spills, clutter or unnecessary equipment/Purposeful proactive rounding/Room/bathroom lighting operational, light cord in reach

## 2023-10-11 ENCOUNTER — EMERGENCY (EMERGENCY)
Facility: HOSPITAL | Age: 28
LOS: 1 days | End: 2023-10-11
Attending: STUDENT IN AN ORGANIZED HEALTH CARE EDUCATION/TRAINING PROGRAM
Payer: MEDICAID

## 2023-10-11 VITALS
HEIGHT: 69 IN | HEART RATE: 121 BPM | WEIGHT: 291.89 LBS | RESPIRATION RATE: 16 BRPM | SYSTOLIC BLOOD PRESSURE: 143 MMHG | OXYGEN SATURATION: 99 % | DIASTOLIC BLOOD PRESSURE: 84 MMHG | TEMPERATURE: 98 F

## 2023-10-11 PROCEDURE — 99285 EMERGENCY DEPT VISIT HI MDM: CPT | Mod: 25

## 2023-10-11 PROCEDURE — 99053 MED SERV 10PM-8AM 24 HR FAC: CPT

## 2023-10-11 PROCEDURE — 99283 EMERGENCY DEPT VISIT LOW MDM: CPT

## 2023-10-11 NOTE — ED ADULT NURSE NOTE - CAS TRG GEN SKIN CONDITION
6/27/2017   CARE MANAGEMENT NOTE:  CM received consult from ER nurse from pt's weekend visit. Pt had requested a listing of assisted living and indepn living facilities in the area. Pt returned to the ER today and was not admitted. Pt left the ER prior to my meeting with pt so CM mailed a list of facilities to pt's home for her to explore on her own.     [place]
Warm/Dry

## 2023-10-11 NOTE — ED PROVIDER NOTE - CLINICAL SUMMARY MEDICAL DECISION MAKING FREE TEXT BOX
28 year old male p/w possible drug overdose.  Was found by the mother of his friend lying on the floor, not breathing, and cyanotic.  Intranasal Narcan given by the mother and patient immediately woke up.  Denies drug use.  .  Hydrate, Utox, alcohol level, CXR, EKG, observe

## 2023-10-11 NOTE — ED ADULT NURSE NOTE - CHIEF COMPLAINT QUOTE
Patient brought by ambulance from a friend's home as reported having a rock party ; claimed that he just drank 3 beers but as per friend's mother patient became unresponsive, turned blue and was snoring. He was given narcan nasal spray then woke up and become agitated; IB kayley to left hand 20 g.

## 2023-10-11 NOTE — ED PROVIDER NOTE - CONSTITUTIONAL, MLM
Well appearing, awake, alert, oriented to person, place, time/situation and in no apparent distress. Patient obese normal...

## 2023-10-11 NOTE — ED PROVIDER NOTE - QUALITY
altered level of consciousness Spiral Flap Text: The defect edges were debeveled with a #15 scalpel blade.  Given the location of the defect, shape of the defect and the proximity to free margins a spiral flap was deemed most appropriate.  Using a sterile surgical marker, an appropriate rotation flap was drawn incorporating the defect and placing the expected incisions within the relaxed skin tension lines where possible. The area thus outlined was incised deep to adipose tissue with a #15 scalpel blade.  The skin margins were undermined to an appropriate distance in all directions utilizing iris scissors.

## 2023-10-11 NOTE — ED PROVIDER NOTE - PROGRESS NOTE DETAILS
The patient has decided to leave against medical advice (AMA).  I have made reasonable attempts to explain to the patient that leaving prior to completion of work up and treatment may result in recurrent or worsening of symptoms, severe permanent disability, pain and suffering, harm, injury, and/or death.  I have explained the risks, benefits, and alternatives to treatment as well as the attendant risks of refusing treatment at this time.  The patient has demonstrated comprehension and verbalizes understanding of these risks.  The patient has been told that they must return to the ER immediately for persistent or recurring symptoms, worsening symptoms, or any concerning symptoms.  The patient has also been informed that they may return to the ER immediately at any time if they change their mind and wish to resume care. The patient has been given the opportunity to ask questions and have them fully answered. Patient AO x 3.  Explained to patient that his HR is elevated and he needs to be observed in the ED as narcan has a short half life and there is a risk of patient becoming apneic again.  Patient expressed understanding but insists on leaving.  He states he wants to go home and rest.  Understands risks including death, agrees to sign out AMA

## 2023-10-11 NOTE — ED ADULT TRIAGE NOTE - CHIEF COMPLAINT QUOTE
[de-identified] :   Today removed her short-arm cast and placed her in a cock-up wrist brace.  She will continue to wear the wrist brace like a cast for the next week.  Then she may start to wean out of it and work on gentle range of motion.  She will continue to avoid any pushing, pulling, or heavy lifting.\par The patient understands that fractures take about 6 weeks to heal.  Random residual pain can occur for 6 months to a year.\par I will see her back in 3 weeks for repeat x-ray evaluation.\par All questions were answered today.
Patient brought by ambulance from a friend's home as reported having a rock party ; claimed that he just drank 3 beers but as per friend's mother patient became unresponsive, turned blue and was snoring. He was given narcan nasal spray then woke up and become agitated; IB kayley to left hand 20 g.

## 2023-10-11 NOTE — ED ADULT NURSE NOTE - NS ED NURSE DC INFO COMPLEXITY
[facility] Clinic Note -- APSO Format    Chief Complaint   Patient presents with   • Follow-up   • Stomach     left side     Assessment & Plan:    Apryl was seen today for follow-up and stomach.    Diagnoses and all orders for this visit:    Type 2 diabetes mellitus with diabetic neuropathy, with long-term current use of insulin (CMS/[facility])  -     POCT GLYCOHEMOGLOBIN ANALYZER -- unsure if trust POC testing today as unanticipated result -- await serum draw then intervene if needed for goal <8.   -     GLYCOHEMOGLOBIN; Future    Hernia of abdominal wall  -     SERVICE TO SURGERY GENERAL    Cervical lymphadenopathy  -likely reactive in nature -- ordering CBC to rule out lymphoma/leukemia.        CBC WITH DIFFERENTIAL; Future    COVID-19 Immunity/Vaccination Status: Completed  Patient inquires about \"booster dose\"-- should likely meet criteria once enough time has passed since dose #2 (indications of diabetes and rheumatoid arthritis medication)     Return for f/u Neck/Ear Pain and Diabetes in 1-2 months.      History of Present Illness:  71 year old female presents to clinic for follow up medical visit and diabetes/A1c. Patient reports doing \"good\" except for chronic \"achy\" LUQ abdominal pain which initially started years ago but has gotten significantly worse in the last month. Soft mass getting bigger. Pain is 10/10. Taking a lot of medications for pain which is not helping. Cannot wear support belt for back pain due to abdominal pain. Worse when laying on that side. Per chart review, was present in 2018.    Left sided neck pain: and ear pain. Started 1 month ago. Denies hx of rash. Did not take ozempic this week b/c worried it's connected to \"lump\" in neck. Has not noticed any other lumps.     Diabetes: 5/10/21 A1c 9.1 up from 7.6. Taking ozempic 1mg once a week and metformin 500 2x daily. Did not take ozempic this week because of lump. Eats wheat tortillas and bread.     Essential HTN: BP today  152/70, 132/70 on re-check. On losartan 25mg.     Hypercholesterolemia: rosuvastatin 20mg    Neuropathic Pain: Used to be on methadone, but no longer. Still using CBD oil, gabapentin, and topicals ... no longer prescribed muscle relaxant PRN says \"doctor who took my piills away wouldn't prescribe it\". She wears gloves at night, which seems to help with hand joint pain. Pain score is 10/10 but can \"handle it\". No side effects reported on gabapentin.      GERD: Controlled with protonix 40mg. \"Stinky gas.\"    Iron deficiency anemia: Still taking iron. Denies fatigue.     Seronegative Rheumatoid Arthritis: On hydroxychloroquine 200mg and folate.     Says she can't read and gets really nervous and scared.       Patient Active Problem List   Diagnosis   • Normocytic anemia   • Colon polyps   • DJD (degenerative joint disease)   • Dermatitis   • Fibromyalgia   • GERD (gastroesophageal reflux disease)   • Helicobacter pylori (H. pylori)   • HTN (hypertension)   • Obesity (BMI 30-39.9)   • Diabetic polyneuropathy (CMS/HCC)   • Pulmonary hypertension, mild, by Echo 2/2012   • Glaucoma   • Depressive disorder, not elsewhere classified   • Obstructive sleep apnea on CPAP   • Chronic back pain   • Post herpetic neuralgia   • Seasonal allergies   • Chronic headaches   • Polypharmacy   • Painful total knee replacement (CMS/HCC)   • Status post revision of total knee replacement, left   • Literacy problem   • Chronic hyponatremia   • PFO (patent foramen ovale), large, by ECHO 2/5/16   • Neuropathic pain   • Numbness in feet   • Type 2 diabetes mellitus with diabetic neuropathy, with long-term current use of insulin (CMS/HCC)   • Blurry vision   • Facial paralysis/[place] palsy   • Seronegative rheumatoid arthritis (CMS/HCC)   • Bilateral carpal tunnel syndrome   • OAB (overactive bladder)   • Mixed stress and urge urinary incontinence   • Prolonged QT interval   • History of diabetic ulcer of foot   • Flat foot   • Metatarsalgia of  right foot   • Chronic pain of left knee   • Hearing decreased   • Otalgia of both ears   • Chronic left ear pain   • Angio-edema   • Pain syndrome, chronic   • Microalbuminuria   • Iron deficiency anemia   • Soreness of tongue   • Diabetic peripheral neuropathy associated with type 2 diabetes mellitus (CMS/HCC)   • Tongue lesion   • Abdominal bloating   • Anxiety   • Pruritus   • Personal history of colonic polyps   • Restless legs syndrome   • Flatus   • Acute cystitis with hematuria      Review of Systems:  Denies fevers, sweats, chills.  Endorses weight loss. Exercising on bike once a day.   Denies SOB.   Endorses headaches: migraines with weather.         Current Outpatient Medications   Medication   • Acetaminophen Extra Strength 500 MG tablet   • Semaglutide, 1 MG/DOSE, (Ozempic, 1 MG/DOSE,) 4 MG/3ML Solution Pen-injector   • rosuvastatin (CRESTOR) 20 MG tablet   • gabapentin (NEURONTIN) 300 MG capsule   • methotrexate (RHEUMATREX) 2.5 MG tablet   • pantoprazole (PROTONIX) 40 MG tablet   • losartan (COZAAR) 25 MG tablet   • hydroxychloroquine (PLAQUENIL) 200 MG tablet   • folic acid (FOLATE) 1 MG tablet   • metFORMIN (GLUCOPHAGE) 500 MG tablet   • ferrous sulfate 325 (65 FE) MG tablet   • DULoxetine (CYMBALTA) 30 MG capsule   • oxybutynin (DITROPAN) 5 MG tablet   • linaclotide (Linzess) 72 MCG Cap   • hydrOXYzine (ATARAX) 25 MG tablet   • cholecalciferol (VITAMIN D) 25 mcg (1,000 units) tablet   • insulin glargine (Lantus SoloStar) 100 UNIT/ML pen-injector   • Insulin Pen Needle (Pen Needles 3/16\") 31G X 5 MM Misc   • Lancets (freestyle) Misc   • fluticasone (FLONASE) 50 MCG/ACT nasal spray   • Simethicone (Phazyme Maximum Strength) 250 MG Cap   • tiZANidine (ZANAFLEX) 2 MG tablet   • chlorhexidine gluconate (PERIDEX) 0.12 % solution   • Lidocaine 5 % Cream   • diclofenac (Voltaren) 1 % gel   • FREESTYLE LITE test strip   • methaDONE (DOLOPHINE) 10 MG tablet   • nystatin (MYCOSTATIN) 684884 UNIT/ML suspension    • Magnesium Citrate 100 MG Tab   • Insulin Syringe 31G X 5/16\" 0.5 ML Misc   • rOPINIRole (REQUIP) 0.5 MG tablet   • Elastic Bandages & Supports (ARTHRITIS GLOVE MEDIUM) Misc   • Blood Glucose Monitoring Suppl (FREESTYLE LITE) Device   • Omega-3 Fatty Acids (FISH OIL) 1000 MG capsule   • Psyllium (METAMUCIL FIBER) 51.7 % Pack   • Cyanocobalamin 1000 MCG Cap   • olopatadine (PATANOL) 0.1 % ophthalmic solution   • Incontinence Supply Disposable (INCONTINENCE BRIEF MEDIUM) Misc   • Incontinence Supply Disposable (ULTIMA INCONTINENCE PAD) Misc   • Multiple Vitamins-Minerals (MULTIVITAL-M) Tab   • Elastic Bandages & Supports (JOBST ACTIVE 15-20MMHG MEDIUM) Misc   • Elastic Bandages & Supports (LUMBAR BACK BRACE/SUPPORT PAD) Misc     No current facility-administered medications for this visit.       Past Medical History, Surgical History, Social History, and Family History were reviewed in Epic and updated during visit today. Please see these Epic sections for further details.     Blood pressure 132/70, pulse 83, temperature 98.7 °F (37.1 °C), temperature source Oral, resp. rate 20, height 5' 1\" (1.549 m), weight 65.9 kg, SpO2 95 %.     Physical Examination:  Constitutional: Vital signs reviewed and interpreted as normal. Patient appears well-developed and well-nourished. No distress.   Psychiatric: Patient has a normal mood and affect. Speech is normal and behavior is normal. Thought content normal.   Skin: Patient is not diaphoretic.   Abdomen: Abdominal tenderness with abdominal wall hernia of left ventral area    Recent Results (from the past 24 hour(s))   POCT GLYCOHEMOGLOBIN ANALYZER    Collection Time: 08/23/21 12:00 AM   Result Value Ref Range    Hemoglobin A1C, POC 9.1 (A) 4.5 - 5.6 %     Emma Cox, MS4  [facility]      Notes above were scribed for Dr. Mcdonald.   Emma Cox, MS IV     I have reviewed the notes scribed above, and agree with their content.     VÍCTOR  Harry DELGADO/Family Medicine  [facility]     Simple: Patient demonstrates quick and easy understanding/Straightforward: Basic instructions, no meds, no home treatment/Verbalized Understanding

## 2023-10-11 NOTE — ED ADULT NURSE NOTE - NS ED NURSE LEVEL OF CONSCIOUSNESS MENTAL STATUS
[address]        NAME: Dalia Guzman is a 11 y o  male  : 2013    MRN: [id number]  DATE: 2023  TIME: 7:56 PM    Assessment and Plan   Acute pharyngitis, unspecified etiology [J02 9]  1  Acute pharyngitis, unspecified etiology        2  Acute cough  Poct Covid 19 Rapid Antigen Test      3  Fever, unspecified fever cause  Poct Covid 19 Rapid Antigen Test            Patient Instructions       Follow up with PCP in 3-5 days  Proceed to  ER if symptoms worsen  Chief Complaint     Chief Complaint   Patient presents with   • Cough         History of Present Illness       Presents with a 2-day history fevers runny nose sore throat and cough no nausea vomiting diarrhea body aches or headaches  No history to COVID or strep  Review of Systems   Review of Systems   Constitutional: Positive for fever  HENT: Positive for rhinorrhea and sore throat  Negative for congestion  Respiratory: Positive for cough  Gastrointestinal: Negative for diarrhea, nausea and vomiting  Musculoskeletal: Negative for myalgias  Neurological: Negative for headaches  Current Medications     No current outpatient medications on file  Current Allergies     Allergies as of 2023   • (No Known Allergies)            The following portions of the patient's history were reviewed and updated as appropriate: allergies, current medications, past family history, past medical history, past social history, past surgical history and problem list      Past Medical History:   Diagnosis Date   • Premature birth        History reviewed  No pertinent surgical history  No family history on file  Medications have been verified  Objective   Pulse 102   Temp (!) 102 8 °F (39 3 °C)   Ht 4' 7" (1 397 m)   Wt 54 7 kg (120 lb 9 6 oz)   SpO2 99%   BMI 28 03 kg/m²   No LMP for male patient  Physical Exam     Physical Exam  Vitals and nursing note reviewed     Constitutional: General: He is active  Appearance: Normal appearance  He is well-developed  HENT:      Head: Normocephalic and atraumatic  Right Ear: Tympanic membrane normal       Left Ear: Tympanic membrane normal       Mouth/Throat:      Mouth: Mucous membranes are moist       Pharynx: Posterior oropharyngeal erythema present  Eyes:      Conjunctiva/sclera: Conjunctivae normal    Cardiovascular:      Rate and Rhythm: Normal rate and regular rhythm  Heart sounds: Normal heart sounds  Pulmonary:      Effort: Pulmonary effort is normal       Breath sounds: Normal breath sounds  Skin:     General: Skin is warm  Neurological:      Mental Status: He is alert  Awake/Alert

## 2023-10-11 NOTE — ED PROVIDER NOTE - DIFFERENTIAL DIAGNOSIS
Differential Diagnosis Ddx includes but not limited to drug overdose, alcohol intoxication, head traumna

## 2023-10-11 NOTE — ED PROVIDER NOTE - PATIENT PORTAL LINK FT
You can access the FollowMyHealth Patient Portal offered by [facility] by registering at the following website: [email or web address]. By joining [employer]’s FollowMyHealth portal, you will also be able to view your health information using other applications (apps) compatible with our system.
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
## 2023-11-09 ENCOUNTER — EMERGENCY (EMERGENCY)
Facility: HOSPITAL | Age: 28
LOS: 0 days | Discharge: LEFT BEFORE DISCHARGE | End: 2023-11-09
Payer: SELF-PAY

## 2023-11-09 VITALS
RESPIRATION RATE: 18 BRPM | HEIGHT: 69 IN | DIASTOLIC BLOOD PRESSURE: 74 MMHG | WEIGHT: 289.91 LBS | TEMPERATURE: 98 F | HEART RATE: 128 BPM | SYSTOLIC BLOOD PRESSURE: 126 MMHG | OXYGEN SATURATION: 94 %

## 2023-11-09 DIAGNOSIS — Z53.21 PROCEDURE AND TREATMENT NOT CARRIED OUT DUE TO PATIENT LEAVING PRIOR TO BEING SEEN BY HEALTH CARE PROVIDER: ICD-10-CM

## 2023-11-09 DIAGNOSIS — T65.94XA TOXIC EFFECT OF UNSPECIFIED SUBSTANCE, UNDETERMINED, INITIAL ENCOUNTER: ICD-10-CM

## 2023-11-09 DIAGNOSIS — R40.4 TRANSIENT ALTERATION OF AWARENESS: ICD-10-CM

## 2023-11-09 PROBLEM — Z78.9 OTHER SPECIFIED HEALTH STATUS: Chronic | Status: ACTIVE | Noted: 2023-10-12

## 2023-11-09 PROCEDURE — L9991: CPT

## 2023-11-09 NOTE — ED ADULT TRIAGE NOTE - CHIEF COMPLAINT QUOTE
Pt BIBEMS s/p overdose. Per EMS pt was found to be unresponsive by his mother in his bedroom, mother gave pt a dose of Narcan by mother and by PD. Pt is A&Ox4 at this time. Pt stated "I did not do any drugs I just drank white claws this morning." No respiratory distress noted at this time.

## 2023-11-16 ENCOUNTER — EMERGENCY (EMERGENCY)
Facility: HOSPITAL | Age: 28
LOS: 0 days | Discharge: HOME | End: 2023-11-16
Attending: EMERGENCY MEDICINE
Payer: MEDICAID

## 2023-11-16 VITALS
SYSTOLIC BLOOD PRESSURE: 167 MMHG | HEART RATE: 98 BPM | DIASTOLIC BLOOD PRESSURE: 109 MMHG | OXYGEN SATURATION: 98 % | TEMPERATURE: 98 F | RESPIRATION RATE: 18 BRPM

## 2023-11-16 VITALS — WEIGHT: 285.06 LBS | HEIGHT: 69 IN

## 2023-11-16 DIAGNOSIS — L02.01 CUTANEOUS ABSCESS OF FACE: ICD-10-CM

## 2023-11-16 PROCEDURE — 10060 I&D ABSCESS SIMPLE/SINGLE: CPT

## 2023-11-16 PROCEDURE — 99283 EMERGENCY DEPT VISIT LOW MDM: CPT | Mod: 25

## 2023-11-16 RX ADMIN — Medication 1 TABLET(S): at 18:57

## 2023-11-16 NOTE — ED PROCEDURE NOTE - PROCEDURE ADDITIONAL DETAILS
abscess draining spontaneously, discussed wound care to internal cheek.  pt has a dentist he will f/u with

## 2023-11-16 NOTE — ED STATDOCS - NS ED ATTENDING STATEMENT MOD
This was a shared visit with the [facility]. I reviewed and verified the documentation and independently performed the documented:

## 2023-11-16 NOTE — ED STATDOCS - PROGRESS NOTE DETAILS
28 y m presents for abscess to inner right royer 28 y m presents for abscess to inner right cheek.  States 6 days ago he was wrestling with his friend when he bit the inside of his right cheek.  went to  and started bactrim 3 days ago.  on exam ~1cm opening to in right cheek, draining pus, no external swelling redness warmth fluctuance or crepitus.  Pus draining spontaneously.  Opening explored with curved hemostat and pus manually expressed.  Pt feels improvement.  Will switch to augmentin, stat dose given in ED.  pt to rinse frequently throughout the day with liserine.  states he has a dentist he will yen tomorrow for f/u outpt.  d/w Dr Kraft.    - ROSANGELA CaponeC

## 2023-11-16 NOTE — ED STATDOCS - ENMT, MLM
+intraoral abscess to right upper mucosa, actively draining with purulent discharge, no facial cellulitis, no trismus, no drooling, normal ROM of neck. Nasal mucosa clear.  Throat has no vesicles, no oropharyngeal exudates and uvula is midline.

## 2023-11-16 NOTE — ED ADULT NURSE NOTE - OBJECTIVE STATEMENT
Pt presents to the ED c/o abscess in right cheek of mouth. Pt had a cut in his mouth that got infected. Pt went to urgent care and received antibiotics. Pt also reports headache and left eye pain. Redness noted in left eye. Pt denies any fevers, n/v. Pt states he has dizziness since the abscess began. AO x 4.

## 2023-11-16 NOTE — ED STATDOCS - PATIENT PORTAL LINK FT
You can access the FollowMyHealth Patient Portal offered by [facility] by registering at the following website: [email or web address]. By joining [email or web address]’s FollowMyHealth portal, you will also be able to view your health information using other applications (apps) compatible with our system.

## 2023-11-16 NOTE — ED STATDOCS - OBJECTIVE STATEMENT
27 y/o male with no pertinent PMHx presents to ED c/o abscess to inner right cheek. States last week he was wrestling with a friend when he was dropped onto the floor and bit the inside of his cheek. Denies headache. States he has tinnitus and photophobia. Went to urgent care and was started on 7-day course of bactrim 3 days ago.

## 2023-11-16 NOTE — ED STATDOCS - NSFOLLOWUPINSTRUCTIONS_ED_ALL_ED_FT
Skin Abscess  ImageA skin abscess is an infected area on or under your skin that contains pus and other material. An abscess can happen almost anywhere on your body. Some abscesses break open (rupture) on their own. Most continue to get worse unless they are treated. The infection can spread deeper into the body and into your blood, which can make you feel sick. Treatment usually involves draining the abscess.    Follow these instructions at home:  Abscess Care     If you have an abscess that has not drained, place a warm, clean, wet washcloth over the abscess several times a day. Do this as told by your doctor.  Follow instructions from your doctor about how to take care of your abscess. Make sure you:    Cover the abscess with a bandage (dressing).  Change your bandage or gauze as told by your doctor.  Wash your hands with soap and water before you change the bandage or gauze. If you cannot use soap and water, use hand .    Check your abscess every day for signs that the infection is getting worse. Check for:    More redness, swelling, or pain.  More fluid or blood.  Warmth.  More pus or a bad smell.    Medicines     Image   Take over-the-counter and prescription medicines only as told by your doctor.  If you were prescribed an antibiotic medicine, take it as told by your doctor. Do not stop taking the antibiotic even if you start to feel better.  General instructions     To avoid spreading the infection:    Do not share personal care items, towels, or hot tubs with others.  Avoid making skin-to-skin contact with other people.    Keep all follow-up visits as told by your doctor. This is important.  Contact a doctor if:  You have more redness, swelling, or pain around your abscess.  You have more fluid or blood coming from your abscess.  Your abscess feels warm when you touch it.  You have more pus or a bad smell coming from your abscess.  You have a fever.  Your muscles ache.  You have chills.  You feel sick.  Get help right away if:  You have very bad (severe) pain.  You see red streaks on your skin spreading away from the abscess.          Incision and Drainage  Incision and drainage is a surgical procedure to open and drain a fluid-filled sac. The sac may be filled with pus, mucus, or blood. Examples of fluid-filled sacs that may need surgical drainage include cysts, skin infections (abscesses), and red lumps that develop from a ruptured cyst or a small abscess (boils). You may need this procedure if the affected area is large, painful, infected, or not healing well.    Tell a health care provider about:  Any allergies you have.  All medicines you are taking, including vitamins, herbs, eye drops, creams, and over-the-counter medicines.  Any problems you or family members have had with anesthetic medicines.  Any blood disorders you have or have had.  Any surgeries you have had.  Any medical conditions you have or have had.  Whether you are pregnant or may be pregnant.  What are the risks?  Generally, this is a safe procedure. However, problems may occur, including:  Infection.  Bleeding.  Allergic reactions to medicines.  Scarring.  The cyst or abscess returns.  Damage to nerves or vessels.  What happens before the procedure?  Medicine    Ask your health care provider about:  Changing or stopping your regular medicines. This is especially important if you are taking diabetes medicines or blood thinners.  Taking medicines such as aspirin and ibuprofen. These medicines can thin your blood. Do not take these medicines unless your health care provider tells you to take them.  Taking over-the-counter medicines, vitamins, herbs, and supplements.  Tests    You may have an exam or testing. These may include:  Ultrasound or other imaging tests to see how large or deep the fluid-filled sac is.  Blood tests to check for infection.  General instructions    Follow instructions from your health care provider about eating or drinking restrictions.  Plan to have someone take you home from the hospital or clinic.  Ask your health care provider whether a responsible adult should care for you for at least 24 hours after you leave the hospital or clinic. This is important.  You may get a tetanus shot.  Ask your health care provider:  How your surgery site will be marked or identified.  What steps will be taken to help prevent infection. These may include:  Removing hair at the surgery site.  Washing skin with a germ-killing soap.  Receiving antibiotic medicine.  What happens during the procedure?  A skin abscess before and after an incision to drain the fluid from it.  An IV may be inserted into one of your veins.  You will be given one or more of the following:  A medicine to help you relax (sedative).  A medicine to numb the area (local anesthetic).  A medicine to make you fall asleep (general anesthetic).  An incision will be made in the top of the fluid-filled sac.  Pus, blood, and mucus will be squeezed out, and a syringe or tube (drain) may be used to empty more fluid from the sac.  Your health care provider will do one of the following. He or she may:  Leave the drain in place for several weeks to drain more fluid.  Stitch open the edges of the incision to make a long-term opening for drainage (marsupialization).  The inside of the sac may be washed out (irrigated) with a sterile solution and packed with gauze before it is covered with a bandage (dressing).  Your health care provider may do a culture test of the drainage fluid.  The procedure may vary among health care providers and hospitals.    What happens after the procedure?  Your blood pressure, heart rate, breathing rate, and blood oxygen level will be monitored often until you leave the hospital or clinic.  Do not drive for 24 hours if you were given a sedative during your procedure.  Summary  Incision and drainage is a surgical procedure to open and drain a fluid-filled sac. The sac may be filled with pus, mucus, or blood.  Before the procedure, you may be given antibiotic medicine to treat or help prevent infection.  During the procedure, an incision will be made in the top of the fluid-filled sac. Pus, blood, and mucus is squeezed out, and a syringe or tube (drain) may be used to empty more fluid from the sac.  The inside of the sac may be washed out (irrigated) with a sterile solution and packed with gauze before it is covered with a bandage (dressing).  This information is not intended to replace advice given to you by your health care provider. Make sure you discuss any questions you have with your health care provider.

## 2023-11-16 NOTE — ED ADULT TRIAGE NOTE - CHIEF COMPLAINT QUOTE
Pt presented to the ER with an abscess inside his cheek. Pt stated that last week he was wrestling with his friends when he cut the inside of his cheek. Pt went to urgent care and was told he needed stitches at the time but pt didn't. Pt was started on ABX from urgent care. Pt stated that he noted to have drainage from it. Pt also c/o eye pain and ringing in his ears.

## 2023-12-11 ENCOUNTER — EMERGENCY (EMERGENCY)
Facility: HOSPITAL | Age: 28
LOS: 0 days | Discharge: HOME | End: 2023-12-11
Attending: STUDENT IN AN ORGANIZED HEALTH CARE EDUCATION/TRAINING PROGRAM
Payer: MEDICAID

## 2023-12-11 VITALS
RESPIRATION RATE: 20 BRPM | OXYGEN SATURATION: 98 % | TEMPERATURE: 98 F | SYSTOLIC BLOOD PRESSURE: 146 MMHG | HEART RATE: 121 BPM | DIASTOLIC BLOOD PRESSURE: 118 MMHG

## 2023-12-11 VITALS — HEIGHT: 69 IN | WEIGHT: 220.02 LBS

## 2023-12-11 DIAGNOSIS — R11.0 NAUSEA: ICD-10-CM

## 2023-12-11 DIAGNOSIS — F19.10 OTHER PSYCHOACTIVE SUBSTANCE ABUSE, UNCOMPLICATED: ICD-10-CM

## 2023-12-11 PROCEDURE — 93010 ELECTROCARDIOGRAM REPORT: CPT

## 2023-12-11 PROCEDURE — 99284 EMERGENCY DEPT VISIT MOD MDM: CPT

## 2023-12-11 PROCEDURE — 93005 ELECTROCARDIOGRAM TRACING: CPT

## 2023-12-11 PROCEDURE — 99283 EMERGENCY DEPT VISIT LOW MDM: CPT

## 2023-12-11 RX ORDER — NALOXONE HYDROCHLORIDE 4 MG/.1ML
4 SPRAY NASAL
Qty: 1 | Refills: 0
Start: 2023-12-11 | End: 2023-12-11

## 2023-12-11 NOTE — ED PROVIDER NOTE - OBJECTIVE STATEMENT
29 y/o male wPMHx of drug use presents to ED for detoxication of drugs. Per triage note Pt states that he has lost 2 good friends and has attempted to kill himself 4 times this year with his last attempt being 4 days ago. Pt has been on percocet and other drugs for the past 3-4 years. Pt states that he woke up today and started "zoning out". Pt did whippets and ETOH use yesterday when pt went out with friends. Pt endorsing nausea, no vomiting. Per mom, pt overdosed 4 days ago at home. Pt has never done a rehab program before, but pt does want help and agrees for rehab. Denies fevers, chest pain, LOC, suicidal thoughts.

## 2023-12-11 NOTE — ED ADULT TRIAGE NOTE - CHIEF COMPLAINT QUOTE
Pt presents to ED psych c/s and detoxication of drugs. Pt states "I have lost 2 good friends and have attempted to kill myself 4x this year with my last attempt last week. I feel very off and like I cannot focus. I last had alcohol, percocet, and whip its yesterday. I feel like I was hearing my own thoughts although I felt like they were voices but the more I think about it is my thoughts. I don't feel suicidal right now but I know I need help."

## 2023-12-11 NOTE — ED PROVIDER NOTE - NSFOLLOWUPINSTRUCTIONS_ED_ALL_ED_FT
Seek immediate medical assistance for any new or worsening symptoms. If you have issues obtaining follow up, please call: [phone] or [phone]  to obtain a doctor or specialist who takes your insurance in your area.     RESOURCES: DRUG AND ALCOHOL ABUSE    HELP HOTLINES:  * [facility]    [phone]  * Alcoholics Anonymous [phone];                                             [phone];                                             [phone];                                             [phone]  * Narcotics Anonymous [phone];                                            [phone];                                            [phone];                                            [phone]  * Pills/Drugs Anonymous    [phone]    GENERAL INFORMATION:  - [facility] Helpline:   377 ALCOHOL  - Alcohol & Drug Hotline: 397.877.9294  - [facility] Office for Substance Abuse Information Line: [phone]    OUTPATIENT CLINICS:  - [facility] Center: [phone]  - [facility]: [phone]   - [facility] Chemical Dependency [phone]    INPATIENT TREATMENT FACILITIES  - [facility]: [phone]  - [facility] Detox/Rehab: [phone]  - [address] Detox: [phone]  - [facility] Detox/Rehab: [phone]  - [facility] (Rehab only): [phone]  - [facility] Detox/Rehab: [phone]  - [facility]: [phone]  - [facility] Detox: [phone]  - [facility] (Rehab only): [phone]  - [facility] Substance Abuse Treatment: [phone]  - [facility] Drug & Alcohol Help Line: [phone]    SOBER STATION:  - [facility] & [place]): [phone]  - [facility]: [phone]  - [facility] Alcohol Crisis Center: [phone] Seek immediate medical assistance for any new or worsening symptoms. If you have issues obtaining follow up, please call: [phone] ([phone]) or [phone]  to obtain a doctor or specialist who takes your insurance in your area.     RESOURCES: DRUG AND ALCOHOL ABUSE    HELP HOTLINES:  * [facility]    [phone]  * Alcoholics Anonymous [phone];                                             [phone];                                             [phone];                                             [phone]  * Narcotics Anonymous [phone];                                            [phone];                                            [phone];                                            [phone]  * Pills/Drugs Anonymous    [phone]    GENERAL INFORMATION:  - [facility] Helpline:   331 ALCOHOL  - Alcohol & Drug Hotline: 368.344.3147  - [facility] Office for Substance Abuse Information Line: [phone]    OUTPATIENT CLINICS:  - [facility]: [phone]  - [facility]: [phone]   - [facility] Chemical Dependency [phone]    INPATIENT TREATMENT FACILITIES  - [facility]: [phone]  - [facility] Detox/Rehab: [phone]  - [address] Detox: [phone]  - [facility] Detox/Rehab: [phone]  - [facility] (Rehab only): [phone]  - [facility] Detox/Rehab: [phone]  - [facility]: [phone]  - [facility] Detox: [phone]  - [facility] (Rehab only): [phone]  - [facility] Substance Abuse Treatment: [phone]  - [facility] Drug & Alcohol Help Line: [phone]    SOBER STATION:  - [facility] & [place]): [phone]  - [facility]: [phone]  - [facility] Alcohol Crisis Center: [phone]

## 2023-12-11 NOTE — ED PROVIDER NOTE - CLINICAL SUMMARY MEDICAL DECISION MAKING FREE TEXT BOX
Adult male here c/o of being foggy after drinking heavily last night and doing whippets. Pt mildly tachy to 106, sinus rhythm normal interval on 12 lead, neurologically intact. Mom has resources at home for drug addiction. Pt states he is motivated now to quit. Will send Narcan to pharmacy and [place]. Adult male here c/o of being foggy after drinking heavily last night and doing whippets. Pt mildly tachy to 106, sinus rhythm normal interval on 12 lead, neurologically intact. Mom has resources at home for drug addiction. Pt states he is motivated now to quit. Will send Narcan to pharmacy and [place].

## 2023-12-11 NOTE — ED ADULT NURSE NOTE - OBJECTIVE STATEMENT
pt arrives, w/ family, stating he needs help with SI HI thoughts. states he has a HX and has attempted in the past, but currently is not having any of those thoughts. calm, cooperative. clean and denies drug use

## 2023-12-11 NOTE — ED ADULT NURSE NOTE - NSFALLUNIVINTERV_ED_ALL_ED
Bed/Stretcher in lowest position, wheels locked, appropriate side rails in place/Call bell, personal items and telephone in reach/Instruct patient to call for assistance before getting out of bed/chair/stretcher/Non-slip footwear applied when patient is off stretcher/[place] to call system/Physically safe environment - no spills, clutter or unnecessary equipment/Purposeful proactive rounding/Room/bathroom lighting operational, light cord in reach Bed/Stretcher in lowest position, wheels locked, appropriate side rails in place/Call bell, personal items and telephone in reach/Instruct patient to call for assistance before getting out of bed/chair/stretcher/Non-slip footwear applied when patient is off stretcher/[place] to call system/Physically safe environment - no spills, clutter or unnecessary equipment/Purposeful proactive rounding/Room/bathroom lighting operational, light cord in reach

## 2023-12-11 NOTE — ED PROVIDER NOTE - PATIENT PORTAL LINK FT
You can access the FollowMyHealth Patient Portal offered by [facility] by registering at the following website: [email or web address]. By joining [employer]’s FollowMyHealth portal, you will also be able to view your health information using other applications (apps) compatible with our system. You can access the FollowMyHealth Patient Portal offered by [facility] by registering at the following website: [email or web address]. By joining [employer]’s FollowMyHealth portal, you will also be able to view your health information using other applications (apps) compatible with our system.

## 2025-04-22 NOTE — ED PROVIDER NOTE - CHIEF COMPLAINT
The patient is a 26y Male complaining of suicidal thoughts.
Bed/Stretcher in lowest position, wheels locked, appropriate side rails in place/Call bell, personal items and telephone in reach/Instruct patient to call for assistance before getting out of bed/chair/stretcher/Non-slip footwear applied when patient is off stretcher/[place] to call system/Physically safe environment - no spills, clutter or unnecessary equipment/Purposeful proactive rounding/Room/bathroom lighting operational, light cord in reach